# Patient Record
Sex: FEMALE | Race: WHITE
[De-identification: names, ages, dates, MRNs, and addresses within clinical notes are randomized per-mention and may not be internally consistent; named-entity substitution may affect disease eponyms.]

---

## 2017-03-06 ENCOUNTER — HOSPITAL ENCOUNTER (OUTPATIENT)
Dept: HOSPITAL 53 - M LAB | Age: 32
End: 2017-03-06
Attending: NEUROLOGICAL SURGERY
Payer: OTHER GOVERNMENT

## 2017-03-06 DIAGNOSIS — Z01.818: Primary | ICD-10-CM

## 2017-03-06 LAB
ALBUMIN SERPL BCG-MCNC: 3.9 GM/DL (ref 3.2–5.2)
ALBUMIN/GLOB SERPL: 1.34 {RATIO} (ref 1–1.93)
ALP SERPL-CCNC: 69 U/L (ref 45–117)
ALT SERPL W P-5'-P-CCNC: 18 U/L (ref 12–78)
ANION GAP SERPL CALC-SCNC: 6 MEQ/L (ref 8–16)
AST SERPL-CCNC: 18 U/L (ref 15–37)
BASOPHILS # BLD AUTO: 0 K/MM3 (ref 0–0.2)
BASOPHILS NFR BLD AUTO: 0.8 % (ref 0–1)
BILIRUB SERPL-MCNC: 0.5 MG/DL (ref 0.2–1)
BUN SERPL-MCNC: 13 MG/DL (ref 7–18)
CALCIUM SERPL-MCNC: 9 MG/DL (ref 8.5–10.1)
CHLORIDE SERPL-SCNC: 105 MEQ/L (ref 98–107)
CO2 SERPL-SCNC: 31 MEQ/L (ref 21–32)
CREAT SERPL-MCNC: 0.71 MG/DL (ref 0.55–1.02)
EOSINOPHIL # BLD AUTO: 0.2 K/MM3 (ref 0–0.5)
EOSINOPHIL NFR BLD AUTO: 2.2 % (ref 0–3)
ERYTHROCYTE [DISTWIDTH] IN BLOOD BY AUTOMATED COUNT: 12.5 % (ref 11.5–14.5)
GFR SERPL CREATININE-BSD FRML MDRD: > 60 ML/MIN/{1.73_M2} (ref 60–?)
GLUCOSE SERPL-MCNC: 53 MG/DL (ref 70–105)
INR PPP: 1.02
LARGE UNSTAINED CELL #: 0.1 K/MM3 (ref 0–0.4)
LARGE UNSTAINED CELL %: 1.5 % (ref 0–4)
LYMPHOCYTES # BLD AUTO: 1.8 K/MM3 (ref 1.5–6.5)
LYMPHOCYTES NFR BLD AUTO: 26.3 % (ref 24–44)
MCH RBC QN AUTO: 30.5 PG (ref 27–33)
MCHC RBC AUTO-ENTMCNC: 33.3 G/DL (ref 32–36.5)
MCV RBC AUTO: 91.4 FL (ref 80–96)
MONOCYTES # BLD AUTO: 0.4 K/MM3 (ref 0–0.8)
MONOCYTES NFR BLD AUTO: 5.4 % (ref 0–5)
NEUTROPHILS # BLD AUTO: 4.1 K/MM3 (ref 1.8–7.7)
NEUTROPHILS NFR BLD AUTO: 63.8 % (ref 36–66)
PLATELET # BLD AUTO: 170 K/MM3 (ref 150–450)
POTASSIUM SERPL-SCNC: 3.9 MEQ/L (ref 3.5–5.1)
PROT SERPL-MCNC: 6.8 GM/DL (ref 6.4–8.2)
SODIUM SERPL-SCNC: 142 MEQ/L (ref 136–145)
WBC # BLD AUTO: 6.5 K/MM3 (ref 4–10)

## 2017-03-06 NOTE — ECGEPIP
Stationary ECG Study

                              [facility]

                                       

                                       Test Date:    2017

Pat Name:     IRAJ PRYOR         Department:   

Patient ID:   [id number]                 Room:         -

Gender:       F                        Technician:   

:          1985               Requested By: KHUSHI BLUM

Order Number: [id number]     Reading MD:   Isadora Knowles

                                 Measurements

Intervals                              Axis          

Rate:         64                       P:            -8

[place]:           141                      QRS:          73

QRSD:         88                       T:            37

QT:           401                                    

QTc:          415                                    

                           Interpretive Statements

SINUS RHYTHM

NSSTTWA  NO PRIOR

Electronically Signed On 3-6-2017 12:54:47 EST by Isadora Knowles

## 2017-03-06 NOTE — REP
Chest two views

 

HISTORY: Preop

 

Comparison: None

 

The lungs are clear.  The heart is normal in size.  The pulmonary vasculature is

normal in appearance.  The bony structure is intact.

 

IMPRESSION:  No acute disease.

 

 

Signed by

Jc Sifuentes MD 03/06/2017 12:33 P

## 2017-03-15 ENCOUNTER — HOSPITAL ENCOUNTER (INPATIENT)
Dept: HOSPITAL 53 - M OR | Age: 32
LOS: 2 days | Discharge: HOME | DRG: 27 | End: 2017-03-17
Attending: NEUROLOGICAL SURGERY | Admitting: NEUROLOGICAL SURGERY
Payer: OTHER GOVERNMENT

## 2017-03-15 VITALS — DIASTOLIC BLOOD PRESSURE: 68 MMHG | SYSTOLIC BLOOD PRESSURE: 111 MMHG

## 2017-03-15 VITALS — SYSTOLIC BLOOD PRESSURE: 118 MMHG | DIASTOLIC BLOOD PRESSURE: 67 MMHG

## 2017-03-15 VITALS — WEIGHT: 133.82 LBS | HEIGHT: 62 IN | BODY MASS INDEX: 24.63 KG/M2

## 2017-03-15 VITALS — SYSTOLIC BLOOD PRESSURE: 106 MMHG | DIASTOLIC BLOOD PRESSURE: 60 MMHG

## 2017-03-15 VITALS — SYSTOLIC BLOOD PRESSURE: 111 MMHG | DIASTOLIC BLOOD PRESSURE: 71 MMHG

## 2017-03-15 VITALS — DIASTOLIC BLOOD PRESSURE: 65 MMHG | SYSTOLIC BLOOD PRESSURE: 109 MMHG

## 2017-03-15 VITALS — DIASTOLIC BLOOD PRESSURE: 65 MMHG | SYSTOLIC BLOOD PRESSURE: 106 MMHG

## 2017-03-15 VITALS — DIASTOLIC BLOOD PRESSURE: 60 MMHG | SYSTOLIC BLOOD PRESSURE: 105 MMHG

## 2017-03-15 VITALS — SYSTOLIC BLOOD PRESSURE: 110 MMHG | DIASTOLIC BLOOD PRESSURE: 66 MMHG

## 2017-03-15 VITALS — DIASTOLIC BLOOD PRESSURE: 63 MMHG | SYSTOLIC BLOOD PRESSURE: 107 MMHG

## 2017-03-15 VITALS — SYSTOLIC BLOOD PRESSURE: 105 MMHG | DIASTOLIC BLOOD PRESSURE: 65 MMHG

## 2017-03-15 VITALS — DIASTOLIC BLOOD PRESSURE: 65 MMHG | SYSTOLIC BLOOD PRESSURE: 103 MMHG

## 2017-03-15 DIAGNOSIS — M54.81: ICD-10-CM

## 2017-03-15 DIAGNOSIS — Z88.0: ICD-10-CM

## 2017-03-15 DIAGNOSIS — Q07.00: Primary | ICD-10-CM

## 2017-03-15 DIAGNOSIS — Z88.8: ICD-10-CM

## 2017-03-15 LAB — CONTROL LINE UCG: (no result)

## 2017-03-15 PROCEDURE — 00B20ZZ EXCISION OF DURA MATER, OPEN APPROACH: ICD-10-PCS | Performed by: NEUROLOGICAL SURGERY

## 2017-03-15 PROCEDURE — 0PB30ZZ EXCISION OF CERVICAL VERTEBRA, OPEN APPROACH: ICD-10-PCS | Performed by: NEUROLOGICAL SURGERY

## 2017-03-15 RX ADMIN — HYDROCODONE BITARTRATE AND ACETAMINOPHEN PRN TAB: 5; 325 TABLET ORAL at 22:26

## 2017-03-15 RX ADMIN — HYDROMORPHONE HYDROCHLORIDE PRN MG: 1 INJECTION, SOLUTION INTRAMUSCULAR; INTRAVENOUS; SUBCUTANEOUS at 13:55

## 2017-03-15 RX ADMIN — HYDROMORPHONE HYDROCHLORIDE PRN MG: 1 INJECTION, SOLUTION INTRAMUSCULAR; INTRAVENOUS; SUBCUTANEOUS at 13:45

## 2017-03-15 RX ADMIN — ONDANSETRON PRN MG: 2 INJECTION INTRAMUSCULAR; INTRAVENOUS at 15:38

## 2017-03-15 RX ADMIN — HYDROMORPHONE HYDROCHLORIDE PRN MG: 1 INJECTION, SOLUTION INTRAMUSCULAR; INTRAVENOUS; SUBCUTANEOUS at 14:10

## 2017-03-15 RX ADMIN — CLINDAMYCIN PHOSPHATE SCH MLS/HR: 600 INJECTION, SOLUTION INTRAVENOUS at 15:39

## 2017-03-15 RX ADMIN — POTASSIUM CHLORIDE, DEXTROSE MONOHYDRATE AND SODIUM CHLORIDE SCH MLS/HR: 150; 5; 450 INJECTION, SOLUTION INTRAVENOUS at 22:25

## 2017-03-15 RX ADMIN — POTASSIUM CHLORIDE, DEXTROSE MONOHYDRATE AND SODIUM CHLORIDE SCH MLS/HR: 150; 5; 450 INJECTION, SOLUTION INTRAVENOUS at 14:45

## 2017-03-15 RX ADMIN — ONDANSETRON PRN MG: 2 INJECTION INTRAMUSCULAR; INTRAVENOUS at 22:25

## 2017-03-15 RX ADMIN — MORPHINE SULFATE PRN MG: 2 INJECTION, SOLUTION INTRAMUSCULAR; INTRAVENOUS at 19:13

## 2017-03-15 RX ADMIN — HYDROMORPHONE HYDROCHLORIDE PRN MG: 1 INJECTION, SOLUTION INTRAMUSCULAR; INTRAVENOUS; SUBCUTANEOUS at 13:50

## 2017-03-15 RX ADMIN — HYDROMORPHONE HYDROCHLORIDE PRN MG: 1 INJECTION, SOLUTION INTRAMUSCULAR; INTRAVENOUS; SUBCUTANEOUS at 14:05

## 2017-03-15 NOTE — REP
Clinical:  Arnold Chiari syndrome.

 

Technique:  AP, lateral, flexion/extension, bilateral oblique, and open-mouth

views.

 

Findings:

Alignment and lordosis is maintained.  There is no evidence for acute fracture /

compression injury or subluxation.  No significant degenerative changes are

appreciated.  Oblique views demonstrate patent neural foramen.  Open mouth view

demonstrates normal C1-C2 articulation and odontoid process.

 

Impression:

Normal cervical spine series.

 

 

Signed by

Jeramy Rene MD 03/15/2017 08:21 A

## 2017-03-16 VITALS — DIASTOLIC BLOOD PRESSURE: 61 MMHG | SYSTOLIC BLOOD PRESSURE: 110 MMHG

## 2017-03-16 VITALS — SYSTOLIC BLOOD PRESSURE: 112 MMHG | DIASTOLIC BLOOD PRESSURE: 67 MMHG

## 2017-03-16 VITALS — SYSTOLIC BLOOD PRESSURE: 96 MMHG | DIASTOLIC BLOOD PRESSURE: 57 MMHG

## 2017-03-16 VITALS — DIASTOLIC BLOOD PRESSURE: 49 MMHG | SYSTOLIC BLOOD PRESSURE: 86 MMHG

## 2017-03-16 VITALS — SYSTOLIC BLOOD PRESSURE: 85 MMHG | DIASTOLIC BLOOD PRESSURE: 54 MMHG

## 2017-03-16 VITALS — SYSTOLIC BLOOD PRESSURE: 99 MMHG | DIASTOLIC BLOOD PRESSURE: 55 MMHG

## 2017-03-16 VITALS — DIASTOLIC BLOOD PRESSURE: 69 MMHG | SYSTOLIC BLOOD PRESSURE: 108 MMHG

## 2017-03-16 VITALS — DIASTOLIC BLOOD PRESSURE: 57 MMHG | SYSTOLIC BLOOD PRESSURE: 98 MMHG

## 2017-03-16 VITALS — DIASTOLIC BLOOD PRESSURE: 53 MMHG | SYSTOLIC BLOOD PRESSURE: 96 MMHG

## 2017-03-16 VITALS — DIASTOLIC BLOOD PRESSURE: 60 MMHG | SYSTOLIC BLOOD PRESSURE: 101 MMHG

## 2017-03-16 VITALS — DIASTOLIC BLOOD PRESSURE: 59 MMHG | SYSTOLIC BLOOD PRESSURE: 96 MMHG

## 2017-03-16 VITALS — DIASTOLIC BLOOD PRESSURE: 52 MMHG | SYSTOLIC BLOOD PRESSURE: 97 MMHG

## 2017-03-16 VITALS — SYSTOLIC BLOOD PRESSURE: 88 MMHG | DIASTOLIC BLOOD PRESSURE: 53 MMHG

## 2017-03-16 VITALS — SYSTOLIC BLOOD PRESSURE: 96 MMHG | DIASTOLIC BLOOD PRESSURE: 64 MMHG

## 2017-03-16 VITALS — SYSTOLIC BLOOD PRESSURE: 109 MMHG | DIASTOLIC BLOOD PRESSURE: 64 MMHG

## 2017-03-16 VITALS — DIASTOLIC BLOOD PRESSURE: 60 MMHG | SYSTOLIC BLOOD PRESSURE: 106 MMHG

## 2017-03-16 VITALS — SYSTOLIC BLOOD PRESSURE: 96 MMHG | DIASTOLIC BLOOD PRESSURE: 59 MMHG

## 2017-03-16 VITALS — SYSTOLIC BLOOD PRESSURE: 110 MMHG | DIASTOLIC BLOOD PRESSURE: 64 MMHG

## 2017-03-16 VITALS — DIASTOLIC BLOOD PRESSURE: 63 MMHG | SYSTOLIC BLOOD PRESSURE: 108 MMHG

## 2017-03-16 LAB
ALBUMIN SERPL BCG-MCNC: 3.5 GM/DL (ref 3.2–5.2)
ALBUMIN/GLOB SERPL: 1.13 {RATIO} (ref 1–1.93)
ALP SERPL-CCNC: 55 U/L (ref 45–117)
ALT SERPL W P-5'-P-CCNC: 16 U/L (ref 12–78)
ANION GAP SERPL CALC-SCNC: 7 MEQ/L (ref 8–16)
AST SERPL-CCNC: 20 U/L (ref 15–37)
BILIRUB SERPL-MCNC: 0.5 MG/DL (ref 0.2–1)
BUN SERPL-MCNC: 7 MG/DL (ref 7–18)
CALCIUM SERPL-MCNC: 8.1 MG/DL (ref 8.5–10.1)
CHLORIDE SERPL-SCNC: 108 MEQ/L (ref 98–107)
CO2 SERPL-SCNC: 26 MEQ/L (ref 21–32)
CREAT SERPL-MCNC: 0.67 MG/DL (ref 0.55–1.02)
ERYTHROCYTE [DISTWIDTH] IN BLOOD BY AUTOMATED COUNT: 12.4 % (ref 11.5–14.5)
GFR SERPL CREATININE-BSD FRML MDRD: > 60 ML/MIN/{1.73_M2} (ref 60–?)
GLUCOSE SERPL-MCNC: 154 MG/DL (ref 70–105)
MCH RBC QN AUTO: 30 PG (ref 27–33)
MCHC RBC AUTO-ENTMCNC: 33.5 G/DL (ref 32–36.5)
MCV RBC AUTO: 89.4 FL (ref 80–96)
PLATELET # BLD AUTO: 187 K/MM3 (ref 150–450)
POTASSIUM SERPL-SCNC: 4.3 MEQ/L (ref 3.5–5.1)
PROT SERPL-MCNC: 6.6 GM/DL (ref 6.4–8.2)
SODIUM SERPL-SCNC: 141 MEQ/L (ref 136–145)
WBC # BLD AUTO: 16.4 K/MM3 (ref 4–10)

## 2017-03-16 RX ADMIN — CLINDAMYCIN PHOSPHATE SCH MLS/HR: 600 INJECTION, SOLUTION INTRAVENOUS at 08:14

## 2017-03-16 RX ADMIN — SODIUM CHLORIDE, PRESERVATIVE FREE SCH ML: 5 INJECTION INTRAVENOUS at 22:00

## 2017-03-16 RX ADMIN — HYDROCODONE BITARTRATE AND ACETAMINOPHEN PRN TAB: 5; 325 TABLET ORAL at 05:02

## 2017-03-16 RX ADMIN — ONDANSETRON PRN MG: 2 INJECTION INTRAMUSCULAR; INTRAVENOUS at 08:13

## 2017-03-16 RX ADMIN — POTASSIUM CHLORIDE, DEXTROSE MONOHYDRATE AND SODIUM CHLORIDE SCH MLS/HR: 150; 5; 450 INJECTION, SOLUTION INTRAVENOUS at 06:19

## 2017-03-16 RX ADMIN — MORPHINE SULFATE PRN MG: 2 INJECTION, SOLUTION INTRAMUSCULAR; INTRAVENOUS at 01:04

## 2017-03-16 RX ADMIN — HYDROCODONE BITARTRATE AND ACETAMINOPHEN PRN TAB: 5; 325 TABLET ORAL at 13:10

## 2017-03-16 RX ADMIN — CLINDAMYCIN PHOSPHATE SCH MLS/HR: 600 INJECTION, SOLUTION INTRAVENOUS at 00:07

## 2017-03-16 RX ADMIN — POTASSIUM CHLORIDE, DEXTROSE MONOHYDRATE AND SODIUM CHLORIDE SCH MLS/HR: 150; 5; 450 INJECTION, SOLUTION INTRAVENOUS at 13:09

## 2017-03-17 VITALS — SYSTOLIC BLOOD PRESSURE: 108 MMHG | DIASTOLIC BLOOD PRESSURE: 64 MMHG

## 2017-03-17 VITALS — SYSTOLIC BLOOD PRESSURE: 106 MMHG | DIASTOLIC BLOOD PRESSURE: 55 MMHG

## 2017-03-17 VITALS — SYSTOLIC BLOOD PRESSURE: 96 MMHG | DIASTOLIC BLOOD PRESSURE: 53 MMHG

## 2017-03-17 VITALS — DIASTOLIC BLOOD PRESSURE: 51 MMHG | SYSTOLIC BLOOD PRESSURE: 91 MMHG

## 2017-03-17 LAB
ALBUMIN SERPL BCG-MCNC: 3.6 GM/DL (ref 3.2–5.2)
ALBUMIN/GLOB SERPL: 1.06 {RATIO} (ref 1–1.93)
ALP SERPL-CCNC: 56 U/L (ref 45–117)
ALT SERPL W P-5'-P-CCNC: 24 U/L (ref 12–78)
ANION GAP SERPL CALC-SCNC: 10 MEQ/L (ref 8–16)
AST SERPL-CCNC: 25 U/L (ref 15–37)
BILIRUB SERPL-MCNC: 0.4 MG/DL (ref 0.2–1)
BUN SERPL-MCNC: 9 MG/DL (ref 7–18)
CALCIUM SERPL-MCNC: 8.5 MG/DL (ref 8.5–10.1)
CHLORIDE SERPL-SCNC: 105 MEQ/L (ref 98–107)
CO2 SERPL-SCNC: 28 MEQ/L (ref 21–32)
CREAT SERPL-MCNC: 0.62 MG/DL (ref 0.55–1.02)
ERYTHROCYTE [DISTWIDTH] IN BLOOD BY AUTOMATED COUNT: 12.6 % (ref 11.5–14.5)
GFR SERPL CREATININE-BSD FRML MDRD: > 60 ML/MIN/{1.73_M2} (ref 60–?)
GLUCOSE SERPL-MCNC: 96 MG/DL (ref 70–105)
MCH RBC QN AUTO: 29.5 PG (ref 27–33)
MCHC RBC AUTO-ENTMCNC: 32.5 G/DL (ref 32–36.5)
MCV RBC AUTO: 91 FL (ref 80–96)
PLATELET # BLD AUTO: 180 K/MM3 (ref 150–450)
POTASSIUM SERPL-SCNC: 4 MEQ/L (ref 3.5–5.1)
PROT SERPL-MCNC: 7 GM/DL (ref 6.4–8.2)
SODIUM SERPL-SCNC: 143 MEQ/L (ref 136–145)
WBC # BLD AUTO: 11 K/MM3 (ref 4–10)

## 2017-03-17 RX ADMIN — HYDROCODONE BITARTRATE AND ACETAMINOPHEN PRN TAB: 7.5; 325 TABLET ORAL at 07:42

## 2017-03-17 RX ADMIN — HYDROCODONE BITARTRATE AND ACETAMINOPHEN PRN TAB: 7.5; 325 TABLET ORAL at 12:28

## 2017-03-17 RX ADMIN — SODIUM CHLORIDE, PRESERVATIVE FREE SCH ML: 5 INJECTION INTRAVENOUS at 05:51

## 2017-03-17 NOTE — RO
DATE OF PROCEDURE:  03/15/2017

 

PREOPERATIVE DIAGNOSES:  Arnold Chiari malformation, occipital neuralgia.

 

POSTOPERATIVE DIAGNOSES:  Arnold Chiari malformation, occipital neuralgia.

 

PROCEDURE:  Decompression of Arnold Chiari malformation with bilateral

suboccipital craniectomies, excision of posterior wall foramen magnum, partial C1

laminectomy, microvascular  decompression of C2 sensory ganglion for the

occipital neuralgia.

 

SURGEON:  Mj Arciniega MD

 

ASSISTANT:  RACQUEL Scott

 

ANESTHESIA:  General.

 

FINDINGS:  Please my office notes for detailed preoperative evaluation and

discussions.  Patient was seen in the preoperative area, where she was joined by

one of her friends was aware of all options, risks, scope, expected outcome,

sequelae, and all possible complications of the surgery.  Patient understood the

surgery is not curative and that the risk of surgery includes, but is not limited

to, death, coma, paralysis, quadriplegia, persistence or worsening of symptoms

and/or deficits, failure of surgery, need for multiple surgeries, cervical

instability requiring multiple other surgeries, seizure disorder, persistent

vegetative state, dependency on life support measures, infection, bleeding,

Pulmonary embolism (PE), myocardial infarction (MI), deep venous thrombosis

(DVT), and/or any  catastrophic sequelae.  Patient wished to proceed with the

surgery.  She claimed she can no longer live with the symptoms, though she

understands the surgery is not curative and there is no guarantee that any of her

symptoms would be addressed.  She understood the rationale for surgery os is

primarily to decompress and re-establish the cerebrospinal flow across the

craniovertebral junction.  She understood dural graft may have to be used.  After

all matters pertaining to surgery, anesthesia, and followup care had been

discussed and after all the surgical options were discussed, she wished to

proceed with the above surgery.

 

DESCRIPTION OF PROCEDURE:  After informed consent, she was taken to the operating

room, where general endotracheal anesthesia was given by the anesthesia service.

There The area of surgery was prepped and draped in the usual sterile fashion.

Prior to that, neurologic monitoring was used and electrodes were placed by the

monitoring service.  Patient was then turned prone on the operating room table

with the lami rolls and head was placed in the Viramontes headrest.

 

After adequate prep and drape, a skin incision was given just beneath the

occipital protuberance and extending over the C2 spinous process.  The place

Alveolar layer was reached and incised.  Cut edges of the blood vessels were

coagulated with bipolar cautery.  Ligamentum nuchae was opened on either side of

the midline.  Suboccipital muscles were striped off the cranium, and wound edges

were held apart with the help of self-retaining retractors.  C1 was exposed, and

the superior aspect of the lamina and the spinous process of C2 was also exposed

and partially shaved to gain access of the inferior border of the C1.  One bur

hole was created on each suboccipital squama and a craniectomy was performed.

The dura was markedly thin and the cerebellar muscle was severely compressed from

bony compression.  Decompression was carried till until the normal dura was seen,

and no compression was seen with generous epidural space.

 

There were no brain pulsations at this time.  The posterior wall of the foramen

magnum was also removed, and that caused faint pulsations in the cerebellum.   C1

laminectomy was done, and that increased the pulsations of the exposed brain and

the craniovertebral junction.  There was thick and fibrous dural band, which was

constricting with thecal sac, and this was incised and that caused much improved

cerebrospinal fluid (CSF) pulsation and the spinal cord also started to pulsate;

thus, no further decompression or dural grafting was felt to be necessary.

Through the thinned out dura, no obvious arachnoidal scarring was seen.

 

Attention was paid at C1-C2.  The C2 sensory ganglion were exposed beneath the C1

arches on either side.  There were dense veins surrounding it and compressing the

ganglion.  They were coagulated and moved away from these ganglions in the hope

of achieving longer lasting headache control from her occipital neuralgia.

 

Blood loss was minimal or maybe less than 50 mL, and wound was closed in anatomic

layers.  Patient tolerated the procedure satisfactorily and was transferred to

the recovery room in stable condition.  She had no family members or friends in

the waiting room.  However, the friend said that she might drop by in the

hospital later.  Patient's neck was stabilized in the cervical collar.

## 2017-03-19 NOTE — DSES
DATE OF ADMISSION: 03/15/2017

DATE OF DISCHARGE: 03/17/2017

 

DISCHARGE DIAGNOSIS:  Arnold-Chiari malformation.

 

HISTORY: Ms. Harper has a past medical history significant for Arnold-Chiari

malformation, occipital neuralgia, cervical spondylosis, lumbar spondylosis, and

sacroiliitis. Her symptoms for her Arnold-Chiari malformation include right-sided

headache with occasional blurred vision, nausea, unexplained cough and dizziness,

difficulty swallowing food, and photo sensitivity. We discussed all possible

risks for surgery regarding her Arnold-Chiari malformation. She wishes to proceed

with surgery.

 

PROCEDURE PERFORMED: On 03/15/2017, bilateral suboccipital craniectomy, excision

of posterior wall foramen magnum, partial C1 laminectomy, microvascular

decompression of C2 sensory ganglion.

 

DIAGNOSTIC DATA: On 03/15/2017, C-spine with AP flexion and extension views

showed normal cervical spine series. On 03/17/2017, chemistries were all within

normal limits, white blood cells were kind of elevated, otherwise hematology was

within normal limits. UPEG preoperative was negative.

 

HOSPITAL COURSE: Patient was admitted to intensive care unit (ICU) following her

surgery. Her hospital course was uneventful. She had no gross focal motor

deficits noted on exam. She remained afebrile. She denies any postoperative

symptoms. She has been cleared for discharge to home.

 

DISCHARGE MEDICATIONS: Her preadmission medications as well as Cipro 250 mg by

mouth twice a day times five days, and Percocet 5/325 one tablet every eight

hours as needed for pain.

 

DISCHARGE INSTRUCTIONS: The following discharge instructions have been discussed

with the patient.

1. Keep area of incision and bandage completely dry until two week followup in

the office.

 

2. Monitor for signs and symptoms of infection as discussed.

 

3. Patient is to call the office to schedule followup appointment, for any

symptoms or any questions.

 

4. Patient is to continue preadmission medications as well as any new medications

as discussed, Cipro and Percocet.

 

5. Diet as tolerated.

 

6. Activity as tolerated.

 

7. Take temperature twice a day. Call if temperature is greater than or equal to

100 degrees Fahrenheit.

 

8. No driving for two weeks.

 

9. Keep cervical collar on.

 

10. Seizure precautions.

 

Patient states she cannot comply to these recommendations. She understands and is

aware of possible catastrophic sequelae if she does not follow them. She does

agree to followup within two weeks or sooner if needed.

## 2017-10-02 ENCOUNTER — HOSPITAL ENCOUNTER (EMERGENCY)
Dept: HOSPITAL 53 - M ED | Age: 32
Discharge: HOME | End: 2017-10-02
Payer: COMMERCIAL

## 2017-10-02 VITALS — HEIGHT: 62 IN | BODY MASS INDEX: 25.63 KG/M2 | WEIGHT: 139.29 LBS

## 2017-10-02 VITALS — SYSTOLIC BLOOD PRESSURE: 106 MMHG | DIASTOLIC BLOOD PRESSURE: 68 MMHG

## 2017-10-02 DIAGNOSIS — G90.09: Primary | ICD-10-CM

## 2017-10-02 DIAGNOSIS — Z88.2: ICD-10-CM

## 2017-10-02 DIAGNOSIS — Q07.00: ICD-10-CM

## 2017-10-02 DIAGNOSIS — Z88.0: ICD-10-CM

## 2017-10-02 LAB
ALBUMIN SERPL BCG-MCNC: 3.8 GM/DL (ref 3.2–5.2)
ALBUMIN/GLOB SERPL: 1.19 {RATIO} (ref 1–1.93)
ALP SERPL-CCNC: 78 U/L (ref 45–117)
ALT SERPL W P-5'-P-CCNC: 20 U/L (ref 12–78)
ANION GAP SERPL CALC-SCNC: 6 MEQ/L (ref 8–16)
AST SERPL-CCNC: 14 U/L (ref 15–37)
BASOPHILS # BLD AUTO: 0.1 10^3/UL (ref 0–0.2)
BASOPHILS NFR BLD AUTO: 1.2 % (ref 0–1)
BILIRUB CONJ SERPL-MCNC: 0.1 MG/DL (ref 0–0.2)
BILIRUB SERPL-MCNC: 0.3 MG/DL (ref 0.2–1)
BUN SERPL-MCNC: 13 MG/DL (ref 7–18)
CALCIUM SERPL-MCNC: 8.6 MG/DL (ref 8.5–10.1)
CHLORIDE SERPL-SCNC: 107 MEQ/L (ref 98–107)
CO2 SERPL-SCNC: 28 MEQ/L (ref 21–32)
CREAT SERPL-MCNC: 0.84 MG/DL (ref 0.55–1.02)
EOSINOPHIL # BLD AUTO: 0.2 10^3/UL (ref 0–0.5)
EOSINOPHIL NFR BLD AUTO: 2.6 % (ref 0–3)
ERYTHROCYTE [DISTWIDTH] IN BLOOD BY AUTOMATED COUNT: 13.1 % (ref 11.5–14.5)
GFR SERPL CREATININE-BSD FRML MDRD: > 60 ML/MIN/{1.73_M2} (ref 60–?)
GLUCOSE SERPL-MCNC: 112 MG/DL (ref 70–105)
IMM GRANULOCYTES NFR BLD: 0.4 % (ref 0–0)
LYMPHOCYTES # BLD AUTO: 3 10^3/UL (ref 1.5–4.5)
LYMPHOCYTES NFR BLD AUTO: 38.4 % (ref 24–44)
MCH RBC QN AUTO: 30.2 PG (ref 27–33)
MCHC RBC AUTO-ENTMCNC: 34.1 G/DL (ref 32–36.5)
MCV RBC AUTO: 88.5 FL (ref 80–96)
MONOCYTES # BLD AUTO: 0.5 10^3/UL (ref 0–0.8)
MONOCYTES NFR BLD AUTO: 6 % (ref 0–5)
NEUTROPHILS # BLD AUTO: 4 10^3/UL (ref 1.8–7.7)
NEUTROPHILS NFR BLD AUTO: 51.4 % (ref 36–66)
NRBC BLD AUTO-RTO: 0 % (ref 0–0)
PLATELET # BLD AUTO: 225 10^3/UL (ref 150–450)
POTASSIUM SERPL-SCNC: 4 MEQ/L (ref 3.5–5.1)
PROT SERPL-MCNC: 7 GM/DL (ref 6.4–8.2)
SODIUM SERPL-SCNC: 141 MEQ/L (ref 136–145)
T3RU NFR SERPL: 30 % (ref 30–39)
T4 SERPL-MCNC: 7.2 UG/DL (ref 4.5–12)
VIT B12 SERPL-MCNC: 401 PG/ML (ref 247–911)
WBC # BLD AUTO: 7.8 10^3/UL (ref 4–10)

## 2017-10-02 NOTE — REPUSA
MRI of the brain.

Clinical history: neuropathy.

Technique: Multiecho multiplanar MRI images of the brain were obtained without administration of cont
rast. Diffusion weighted images with ADC mapping was also obtained.

Findings:

The ventricles and sulci are symmetric bilaterally. The brain parenchyma demonstrates uniform and nor
mal signal on all sequences. There is no midline shift, mass effect, or extra-axial fluid collection.
 The midline intracranial structures do not demonstrate any gross abnormalities. The cervical cranial
 junction is intact. The orbits are unremarkable. The visualized paranasal sinuses and mastoid air ce
lls are clear. The osseous structures and superficial soft tissues are unremarkable. The vascular str
uctures demonstrate appropriate flow voids.

Impression: Normal MRI of the Brain.

     Electronically signed by PHILIPP CROOKS MD on 10/02/2017 09:41:52 PM ET

## 2017-10-04 ENCOUNTER — HOSPITAL ENCOUNTER (EMERGENCY)
Dept: HOSPITAL 53 - M ED | Age: 32
Discharge: HOME | End: 2017-10-04
Payer: COMMERCIAL

## 2017-10-04 VITALS — DIASTOLIC BLOOD PRESSURE: 73 MMHG | SYSTOLIC BLOOD PRESSURE: 108 MMHG

## 2017-10-04 VITALS — WEIGHT: 135.14 LBS | HEIGHT: 62 IN | BODY MASS INDEX: 24.87 KG/M2

## 2017-10-04 DIAGNOSIS — M54.2: ICD-10-CM

## 2017-10-04 DIAGNOSIS — T76.21XA: Primary | ICD-10-CM

## 2017-10-04 DIAGNOSIS — G89.29: ICD-10-CM

## 2017-10-04 LAB
ADD MANUAL DIFFER: NO
ALBUMIN SERPL BCG-MCNC: 4 GM/DL (ref 3.2–5.2)
ALBUMIN/GLOB SERPL: 1.33 {RATIO} (ref 1–1.93)
ALP SERPL-CCNC: 59 U/L (ref 45–117)
ALT SERPL W P-5'-P-CCNC: 19 U/L (ref 12–78)
ANION GAP SERPL CALC-SCNC: 6 MEQ/L (ref 8–16)
AST SERPL-CCNC: 12 U/L (ref 15–37)
BASOPHILS # BLD AUTO: 0.1 10^3/UL (ref 0–0.2)
BASOPHILS NFR BLD AUTO: 1 % (ref 0–1)
BILIRUB SERPL-MCNC: 0.6 MG/DL (ref 0.2–1)
BUN SERPL-MCNC: 12 MG/DL (ref 7–18)
CALCIUM SERPL-MCNC: 9.3 MG/DL (ref 8.5–10.1)
CHLORIDE SERPL-SCNC: 105 MEQ/L (ref 98–107)
CO2 SERPL-SCNC: 28 MEQ/L (ref 21–32)
CONTROL LINE HCG: (no result)
CREAT SERPL-MCNC: 0.63 MG/DL (ref 0.55–1.02)
DIFF SLIDE NUMBER: 240
EOSINOPHIL # BLD AUTO: 0.1 10^3/UL (ref 0–0.5)
EOSINOPHIL NFR BLD AUTO: 1.7 % (ref 0–3)
ERYTHROCYTE [DISTWIDTH] IN BLOOD BY AUTOMATED COUNT: 12.9 % (ref 11.5–14.5)
GFR SERPL CREATININE-BSD FRML MDRD: > 60 ML/MIN/{1.73_M2} (ref 60–?)
GLUCOSE SERPL-MCNC: 89 MG/DL (ref 70–105)
HBV SURFACE AB SER QL: NEGATIVE
IMM GRANULOCYTES NFR BLD: 0.3 % (ref 0–0)
LYMPHOCYTES # BLD AUTO: 2.2 10^3/UL (ref 1.5–4.5)
LYMPHOCYTES NFR BLD AUTO: 36.8 % (ref 24–44)
MCH RBC QN AUTO: 29.7 PG (ref 27–33)
MCHC RBC AUTO-ENTMCNC: 34 G/DL (ref 32–36.5)
MCV RBC AUTO: 87.5 FL (ref 80–96)
MONOCYTES # BLD AUTO: 0.3 10^3/UL (ref 0–0.8)
MONOCYTES NFR BLD AUTO: 5.6 % (ref 0–5)
NEUTROPHILS # BLD AUTO: 3.2 10^3/UL (ref 1.8–7.7)
NEUTROPHILS NFR BLD AUTO: 54.6 % (ref 36–66)
NRBC BLD AUTO-RTO: 0 % (ref 0–0)
PLATELET # BLD AUTO: 206 10^3/UL (ref 150–450)
POTASSIUM SERPL-SCNC: 4.4 MEQ/L (ref 3.5–5.1)
PROT SERPL-MCNC: 7 GM/DL (ref 6.4–8.2)
SODIUM SERPL-SCNC: 139 MEQ/L (ref 136–145)
WBC # BLD AUTO: 5.9 10^3/UL (ref 4–10)

## 2017-10-04 PROCEDURE — 85025 COMPLETE CBC W/AUTO DIFF WBC: CPT

## 2017-10-04 PROCEDURE — 96372 THER/PROPH/DIAG INJ SC/IM: CPT

## 2017-10-04 PROCEDURE — 86706 HEP B SURFACE ANTIBODY: CPT

## 2017-10-04 PROCEDURE — 87340 HEPATITIS B SURFACE AG IA: CPT

## 2017-10-04 PROCEDURE — 87389 HIV-1 AG W/HIV-1&-2 AB AG IA: CPT

## 2017-10-04 PROCEDURE — 84703 CHORIONIC GONADOTROPIN ASSAY: CPT

## 2017-10-04 PROCEDURE — 86780 TREPONEMA PALLIDUM: CPT

## 2017-10-04 PROCEDURE — 99282 EMERGENCY DEPT VISIT SF MDM: CPT

## 2017-10-04 PROCEDURE — 86803 HEPATITIS C AB TEST: CPT

## 2017-10-04 PROCEDURE — 80053 COMPREHEN METABOLIC PANEL: CPT

## 2017-10-18 ENCOUNTER — HOSPITAL ENCOUNTER (OUTPATIENT)
Dept: HOSPITAL 53 - M SFHCPLAZ | Age: 32
End: 2017-10-18
Attending: FAMILY MEDICINE
Payer: COMMERCIAL

## 2017-10-18 DIAGNOSIS — E28.319: ICD-10-CM

## 2017-10-18 DIAGNOSIS — R23.2: Primary | ICD-10-CM

## 2017-10-18 DIAGNOSIS — Z11.3: ICD-10-CM

## 2017-10-18 LAB
FSH SERPL-ACNC: 4 MIU/ML
LH SERPL-ACNC: 7 MIU/ML
T4 FREE SERPL-MCNC: 0.83 NG/DL (ref 0.76–1.46)

## 2018-02-13 ENCOUNTER — HOSPITAL ENCOUNTER (OUTPATIENT)
Dept: HOSPITAL 53 - M SFHCPLAZ | Age: 33
End: 2018-02-13
Attending: FAMILY MEDICINE
Payer: COMMERCIAL

## 2018-02-13 DIAGNOSIS — Z92.29: Primary | ICD-10-CM

## 2018-02-13 PROCEDURE — 86765 RUBEOLA ANTIBODY: CPT

## 2018-02-14 LAB — RUBELLA IGG QUALITATIVE: (no result)

## 2018-02-15 LAB — RUBEOLA IGG ANTIBODY: <25 AU/ML

## 2018-06-06 ENCOUNTER — HOSPITAL ENCOUNTER (OUTPATIENT)
Dept: HOSPITAL 53 - M SFHCPLAZ | Age: 33
End: 2018-06-06
Attending: FAMILY MEDICINE
Payer: SELF-PAY

## 2018-06-06 DIAGNOSIS — Z11.3: Primary | ICD-10-CM

## 2018-06-06 LAB
CHLAMYDIA DNA AMPLIFICATION: NEGATIVE
GC DNA AMPLIFICATION: NEGATIVE
HIV 1&2 SCREEN CENTAUR: NEGATIVE
SYPHILIS: NONREACTIVE

## 2018-06-06 PROCEDURE — 36415 COLL VENOUS BLD VENIPUNCTURE: CPT

## 2019-01-25 ENCOUNTER — HOSPITAL ENCOUNTER (EMERGENCY)
Dept: HOSPITAL 53 - M ED | Age: 34
Discharge: HOME | End: 2019-01-25
Payer: COMMERCIAL

## 2019-01-25 VITALS — BODY MASS INDEX: 25.82 KG/M2 | HEIGHT: 62 IN | WEIGHT: 140.3 LBS

## 2019-01-25 VITALS — SYSTOLIC BLOOD PRESSURE: 100 MMHG | DIASTOLIC BLOOD PRESSURE: 74 MMHG

## 2019-01-25 DIAGNOSIS — Z88.0: ICD-10-CM

## 2019-01-25 DIAGNOSIS — R07.1: Primary | ICD-10-CM

## 2019-01-25 DIAGNOSIS — Z87.728: ICD-10-CM

## 2019-01-25 DIAGNOSIS — I49.9: ICD-10-CM

## 2019-01-25 DIAGNOSIS — Z88.2: ICD-10-CM

## 2019-01-25 DIAGNOSIS — Z88.1: ICD-10-CM

## 2019-01-25 LAB
ALBUMIN SERPL BCG-MCNC: 4 GM/DL (ref 3.2–5.2)
ALT SERPL W P-5'-P-CCNC: 33 U/L (ref 12–78)
B-HCG SERPL-ACNC: < 1 MIU/ML
BASOPHILS # BLD AUTO: 0.1 10^3/UL (ref 0–0.2)
BASOPHILS NFR BLD AUTO: 0.8 % (ref 0–1)
BILIRUB SERPL-MCNC: 0.2 MG/DL (ref 0.2–1)
BUN SERPL-MCNC: 19 MG/DL (ref 7–18)
CALCIUM SERPL-MCNC: 8.3 MG/DL (ref 8.5–10.1)
CHLORIDE SERPL-SCNC: 106 MEQ/L (ref 98–107)
CK MB CFR.DF SERPL CALC: 0.95
CK MB SERPL-MCNC: 1 NG/ML (ref ?–3.6)
CK SERPL-CCNC: 105 U/L (ref 26–192)
CO2 SERPL-SCNC: 26 MEQ/L (ref 21–32)
CREAT SERPL-MCNC: 0.71 MG/DL (ref 0.55–1.3)
EOSINOPHIL # BLD AUTO: 0.2 10^3/UL (ref 0–0.5)
EOSINOPHIL NFR BLD AUTO: 2.4 % (ref 0–3)
GFR SERPL CREATININE-BSD FRML MDRD: > 60 ML/MIN/{1.73_M2} (ref 60–?)
GLUCOSE SERPL-MCNC: 68 MG/DL (ref 70–100)
HCT VFR BLD AUTO: 42 % (ref 36–47)
HGB BLD-MCNC: 14.2 G/DL (ref 12–15.5)
LYMPHOCYTES # BLD AUTO: 3.5 10^3/UL (ref 1.5–4.5)
LYMPHOCYTES NFR BLD AUTO: 35.6 % (ref 24–44)
MCH RBC QN AUTO: 30 PG (ref 27–33)
MCHC RBC AUTO-ENTMCNC: 33.8 G/DL (ref 32–36.5)
MCV RBC AUTO: 88.6 FL (ref 80–96)
MONOCYTES # BLD AUTO: 0.6 10^3/UL (ref 0–0.8)
MONOCYTES NFR BLD AUTO: 5.9 % (ref 0–5)
NEUTROPHILS # BLD AUTO: 5.3 10^3/UL (ref 1.8–7.7)
NEUTROPHILS NFR BLD AUTO: 54.7 % (ref 36–66)
PLATELET # BLD AUTO: 216 10^3/UL (ref 150–450)
POTASSIUM SERPL-SCNC: 4.1 MEQ/L (ref 3.5–5.1)
PROT SERPL-MCNC: 6.8 GM/DL (ref 6.4–8.2)
RBC # BLD AUTO: 4.74 10^6/UL (ref 4–5.4)
SODIUM SERPL-SCNC: 140 MEQ/L (ref 136–145)
TROPONIN I SERPL-MCNC: < 0.02 NG/ML (ref ?–0.1)
TSH SERPL DL<=0.005 MIU/L-ACNC: 1.02 UIU/ML (ref 0.36–3.74)
WBC # BLD AUTO: 9.7 10^3/UL (ref 4–10)

## 2019-01-25 PROCEDURE — 84443 ASSAY THYROID STIM HORMONE: CPT

## 2019-01-25 PROCEDURE — 93005 ELECTROCARDIOGRAM TRACING: CPT

## 2019-01-25 PROCEDURE — 99284 EMERGENCY DEPT VISIT MOD MDM: CPT

## 2019-01-25 PROCEDURE — 84702 CHORIONIC GONADOTROPIN TEST: CPT

## 2019-01-25 PROCEDURE — 85025 COMPLETE CBC W/AUTO DIFF WBC: CPT

## 2019-01-25 PROCEDURE — 82553 CREATINE MB FRACTION: CPT

## 2019-01-25 PROCEDURE — 85379 FIBRIN DEGRADATION QUANT: CPT

## 2019-01-25 PROCEDURE — 82550 ASSAY OF CK (CPK): CPT

## 2019-01-25 PROCEDURE — 96374 THER/PROPH/DIAG INJ IV PUSH: CPT

## 2019-01-25 PROCEDURE — 71046 X-RAY EXAM CHEST 2 VIEWS: CPT

## 2019-01-25 PROCEDURE — 80053 COMPREHEN METABOLIC PANEL: CPT

## 2019-01-26 NOTE — REP
Clinical:  Right-sided chest pain .

 

Comparison: 03/06/2017 .

 

Technique:  PA and lateral.

 

Findings:

The mediastinum and cardiac silhouette are normal.  The lung fields are clear and

without acute consolidation, effusion, or pneumothorax.  The skeletal structures

are intact and normal.

 

Impression:

1.   No acute cardiopulmonary process.

 

 

Electronically Signed by

Jeramy Rene MD 01/26/2019 08:09 A

## 2019-01-27 NOTE — ECGEPIP
Stationary ECG Study

                           [facility]

                                       

                                       Test Date:    2019

Pat Name:     IRAJ PRYOR         Department:   

Patient ID:   [id number]                 Room:         -

Gender:       F                        Technician:   ct

:          1985               Requested By: MURTAZA IZQUIERDO PA-C

Order Number: [id number]     Reading MD:   Marielos Fontaine

                                 Measurements

Intervals                              Axis          

Rate:         72                       P:            50

ND:           156                      QRS:          67

QRSD:         96                       T:            43

QT:           385                                    

QTc:          423                                    

                           Interpretive Statements

SINUS RHYTHM WITH SINUS ARRHYTHMIA

INCREASED RATE 3/6/17

Electronically Signed On 2019 9:10:04 EST by Marielos Fontaine

## 2019-04-23 ENCOUNTER — HOSPITAL ENCOUNTER (OUTPATIENT)
Dept: HOSPITAL 53 - M LAB REF | Age: 34
End: 2019-04-23
Attending: FAMILY MEDICINE
Payer: COMMERCIAL

## 2019-04-23 DIAGNOSIS — N91.0: Primary | ICD-10-CM

## 2019-04-23 LAB
ALBUMIN SERPL BCG-MCNC: 3.7 GM/DL (ref 3.2–5.2)
ALT SERPL W P-5'-P-CCNC: 17 U/L (ref 12–78)
B-HCG SERPL-ACNC: < 1 MIU/ML
BILIRUB SERPL-MCNC: 0.5 MG/DL (ref 0.2–1)
BUN SERPL-MCNC: 12 MG/DL (ref 7–18)
CALCIUM SERPL-MCNC: 8.5 MG/DL (ref 8.5–10.1)
CHLORIDE SERPL-SCNC: 107 MEQ/L (ref 98–107)
CO2 SERPL-SCNC: 28 MEQ/L (ref 21–32)
CREAT SERPL-MCNC: 0.58 MG/DL (ref 0.55–1.3)
GFR SERPL CREATININE-BSD FRML MDRD: > 60 ML/MIN/{1.73_M2} (ref 60–?)
GLUCOSE SERPL-MCNC: 85 MG/DL (ref 70–100)
POTASSIUM SERPL-SCNC: 4.4 MEQ/L (ref 3.5–5.1)
PROT SERPL-MCNC: 6.7 GM/DL (ref 6.4–8.2)
SODIUM SERPL-SCNC: 140 MEQ/L (ref 136–145)
T4 FREE SERPL-MCNC: 0.81 NG/DL (ref 0.76–1.46)
TSH SERPL DL<=0.005 MIU/L-ACNC: 1.42 UIU/ML (ref 0.36–3.74)

## 2019-07-24 ENCOUNTER — HOSPITAL ENCOUNTER (OUTPATIENT)
Dept: HOSPITAL 53 - M LAB REF | Age: 34
End: 2019-07-24
Attending: OBSTETRICS & GYNECOLOGY
Payer: COMMERCIAL

## 2019-07-24 DIAGNOSIS — Z12.4: Primary | ICD-10-CM

## 2019-07-26 LAB — HPV LOW VOL RFLX: (no result)

## 2019-09-26 ENCOUNTER — HOSPITAL ENCOUNTER (EMERGENCY)
Dept: HOSPITAL 53 - M ED | Age: 34
LOS: 1 days | Discharge: HOME | End: 2019-09-27
Payer: COMMERCIAL

## 2019-09-26 VITALS — HEIGHT: 62 IN | WEIGHT: 145.31 LBS | BODY MASS INDEX: 26.74 KG/M2

## 2019-09-26 DIAGNOSIS — Z88.0: ICD-10-CM

## 2019-09-26 DIAGNOSIS — Z88.2: ICD-10-CM

## 2019-09-26 DIAGNOSIS — Z3A.09: ICD-10-CM

## 2019-09-26 DIAGNOSIS — O34.81: Primary | ICD-10-CM

## 2019-09-26 DIAGNOSIS — Z79.899: ICD-10-CM

## 2019-09-26 DIAGNOSIS — Q07.00: ICD-10-CM

## 2019-09-26 DIAGNOSIS — O99.89: ICD-10-CM

## 2019-09-26 PROCEDURE — 80048 BASIC METABOLIC PNL TOTAL CA: CPT

## 2019-09-26 PROCEDURE — 81001 URINALYSIS AUTO W/SCOPE: CPT

## 2019-09-26 PROCEDURE — 83690 ASSAY OF LIPASE: CPT

## 2019-09-26 PROCEDURE — 99284 EMERGENCY DEPT VISIT MOD MDM: CPT

## 2019-09-26 PROCEDURE — 80076 HEPATIC FUNCTION PANEL: CPT

## 2019-09-26 PROCEDURE — 36415 COLL VENOUS BLD VENIPUNCTURE: CPT

## 2019-09-26 PROCEDURE — 85025 COMPLETE CBC W/AUTO DIFF WBC: CPT

## 2019-09-26 PROCEDURE — 93976 VASCULAR STUDY: CPT

## 2019-09-26 PROCEDURE — 96374 THER/PROPH/DIAG INJ IV PUSH: CPT

## 2019-09-26 PROCEDURE — 76801 OB US < 14 WKS SINGLE FETUS: CPT

## 2019-09-27 VITALS — SYSTOLIC BLOOD PRESSURE: 103 MMHG | DIASTOLIC BLOOD PRESSURE: 62 MMHG

## 2019-09-27 LAB
ALBUMIN SERPL BCG-MCNC: 3.6 GM/DL (ref 3.2–5.2)
ALT SERPL W P-5'-P-CCNC: 16 U/L (ref 12–78)
BASOPHILS # BLD AUTO: 0.1 10^3/UL (ref 0–0.2)
BASOPHILS NFR BLD AUTO: 0.5 % (ref 0–1)
BILIRUB CONJ SERPL-MCNC: < 0.1 MG/DL (ref 0–0.2)
BILIRUB SERPL-MCNC: 0.2 MG/DL (ref 0.2–1)
BUN SERPL-MCNC: 10 MG/DL (ref 7–18)
CALCIUM SERPL-MCNC: 9.3 MG/DL (ref 8.5–10.1)
CHLORIDE SERPL-SCNC: 105 MEQ/L (ref 98–107)
CO2 SERPL-SCNC: 25 MEQ/L (ref 21–32)
CREAT SERPL-MCNC: 0.52 MG/DL (ref 0.55–1.3)
EOSINOPHIL # BLD AUTO: 0.1 10^3/UL (ref 0–0.5)
EOSINOPHIL NFR BLD AUTO: 0.5 % (ref 0–3)
GFR SERPL CREATININE-BSD FRML MDRD: > 60 ML/MIN/{1.73_M2} (ref 60–?)
GLUCOSE SERPL-MCNC: 83 MG/DL (ref 70–100)
HCT VFR BLD AUTO: 36.4 % (ref 36–47)
HGB BLD-MCNC: 12.5 G/DL (ref 12–15.5)
LIPASE SERPL-CCNC: 109 U/L (ref 73–393)
LYMPHOCYTES # BLD AUTO: 2.8 10^3/UL (ref 1.5–5)
LYMPHOCYTES NFR BLD AUTO: 25 % (ref 24–44)
MCH RBC QN AUTO: 30.6 PG (ref 27–33)
MCHC RBC AUTO-ENTMCNC: 34.3 G/DL (ref 32–36.5)
MCV RBC AUTO: 89.2 FL (ref 80–96)
MONOCYTES # BLD AUTO: 0.7 10^3/UL (ref 0–0.8)
MONOCYTES NFR BLD AUTO: 6.3 % (ref 0–5)
NEUTROPHILS # BLD AUTO: 7.4 10^3/UL (ref 1.5–8.5)
NEUTROPHILS NFR BLD AUTO: 66.4 % (ref 36–66)
PLATELET # BLD AUTO: 191 10^3/UL (ref 150–450)
POTASSIUM SERPL-SCNC: 3.7 MEQ/L (ref 3.5–5.1)
PROT SERPL-MCNC: 6.6 GM/DL (ref 6.4–8.2)
RBC # BLD AUTO: 4.08 10^6/UL (ref 4–5.4)
SODIUM SERPL-SCNC: 137 MEQ/L (ref 136–145)
WBC # BLD AUTO: 11.2 10^3/UL (ref 4–10)

## 2019-09-27 NOTE — REPVR
PROCEDURE INFORMATION: 

Exam: US Pregnancy First Trimester, Transabdominal 

Exam date and time: 9/27/2019 12:55 AM 

Clinical history: 33 years old, female; Pregnancy complicated by abdominal or 

pelvic pain; Right lower quadrant; First trimester; Gestational age or lmp: 9w 

4d; Pregnant; Additional info: R pelvic pain, severe, unknown gestation age 



TECHNIQUE: 

Imaging protocol: Real-time transabdominal obstetrical ultrasound of the 

maternal pelvis and a first trimester pregnancy, less than 14 weeks 0 days, 

with image documentation. 



COMPARISON: 

CR Spine. Lumbosacral, complete 11/30/2016 3:59 PM 



FINDINGS: 



GESTATION: 

Gestation: Single live intrauterine pregnancy corresponding to 9 weeks 4 days 

with FLYNN of 4/27/20. Fetal pole is identified. Crown-rump length measures 2.7 

cm. 

Heart rate: Fetal heart rate is 169 beats per minute. 

No subchorionic hemorrhage is seen. 



MATERNAL: 

Uterus: Unremarkable. 

Cervix: Unremarkable. 

Right adnexa: Right ovarian cyst likely corpus luteal measuring 2.4 x 2.1 x 2.3 

cm. Right ovary measures 5.0 x 5.2 x 3.0 cm. Normal vascular flow is seen in 

the right ovary. 

Left adnexa: Left ovary is unremarkable measuring 2.8 x 4.3 x 2.0 cm. 

Intraperitoneal: No intraperitoneal free fluid. 



IMPRESSION: 

Single live intrauterine pregnancy corresponding to 9 weeks 4 days with FLYNN of 

4/27/20. Followup is recommended.

No subchorionic hemorrhage is seen.

Right ovarian cyst likely corpus luteal measuring 2.4 x 2.1 x 2.3 cm.  



Electronically signed by: Yuliya Harp On 09/27/2019  02:20:07 AM

## 2019-10-01 ENCOUNTER — HOSPITAL ENCOUNTER (OUTPATIENT)
Dept: HOSPITAL 53 - M SMT | Age: 34
End: 2019-10-01
Attending: ADVANCED PRACTICE MIDWIFE
Payer: COMMERCIAL

## 2019-10-01 DIAGNOSIS — Z3A.10: ICD-10-CM

## 2019-10-01 DIAGNOSIS — Z34.81: Primary | ICD-10-CM

## 2019-10-01 LAB
ALT SERPL W P-5'-P-CCNC: 16 U/L (ref 12–78)
BASOPHILS # BLD AUTO: 0.1 10^3/UL (ref 0–0.2)
BASOPHILS NFR BLD AUTO: 0.7 % (ref 0–1)
BILIRUB SERPL-MCNC: 0.4 MG/DL (ref 0.2–1)
CREAT SERPL-MCNC: 0.57 MG/DL (ref 0.55–1.3)
EOSINOPHIL # BLD AUTO: 0.1 10^3/UL (ref 0–0.5)
EOSINOPHIL NFR BLD AUTO: 0.6 % (ref 0–3)
EST. AVERAGE GLUCOSE BLD GHB EST-MCNC: 94 MG/DL (ref 60–110)
GFR SERPL CREATININE-BSD FRML MDRD: > 60 ML/MIN/{1.73_M2} (ref 60–?)
HCT VFR BLD AUTO: 42.4 % (ref 36–47)
HGB BLD-MCNC: 14.3 G/DL (ref 12–15.5)
LDH SERPL L TO P-CCNC: 157 U/L (ref 84–246)
LYMPHOCYTES # BLD AUTO: 2.5 10^3/UL (ref 1.5–5)
LYMPHOCYTES NFR BLD AUTO: 23.3 % (ref 24–44)
MCH RBC QN AUTO: 30.8 PG (ref 27–33)
MCHC RBC AUTO-ENTMCNC: 33.7 G/DL (ref 32–36.5)
MCV RBC AUTO: 91.4 FL (ref 80–96)
MONOCYTES # BLD AUTO: 0.7 10^3/UL (ref 0–0.8)
MONOCYTES NFR BLD AUTO: 6.3 % (ref 0–5)
NEUTROPHILS # BLD AUTO: 7.3 10^3/UL (ref 1.5–8.5)
NEUTROPHILS NFR BLD AUTO: 68.3 % (ref 36–66)
PLATELET # BLD AUTO: 195 10^3/UL (ref 150–450)
RBC # BLD AUTO: 4.64 10^6/UL (ref 4–5.4)
URATE SERPL-MCNC: 2.5 MG/DL (ref 2.6–6)
WBC # BLD AUTO: 10.7 10^3/UL (ref 4–10)

## 2019-10-02 LAB
HCV AB SER QL: 0.1 INDEX (ref ?–0.8)
HIV 1+2 AB+HIV1 P24 AG SERPL QL IA: NEGATIVE
RUBELLA IGG QUALITATIVE: (no result)

## 2019-10-28 ENCOUNTER — HOSPITAL ENCOUNTER (OUTPATIENT)
Dept: HOSPITAL 53 - M LAB REF | Age: 34
End: 2019-10-28
Attending: ADVANCED PRACTICE MIDWIFE
Payer: COMMERCIAL

## 2019-10-28 DIAGNOSIS — Z3A.00: ICD-10-CM

## 2019-10-28 DIAGNOSIS — O34.219: Primary | ICD-10-CM

## 2019-10-28 LAB
CHLAMYDIA DNA AMPLIFICATION: NEGATIVE
CREAT UR-MCNC: 30.6 MG/DL
N GONORRHOEA RRNA SPEC QL NAA+PROBE: NEGATIVE
PROT UR-MCNC: < 5 MG/DL (ref 0–12)

## 2019-11-18 ENCOUNTER — HOSPITAL ENCOUNTER (OUTPATIENT)
Dept: HOSPITAL 53 - M RAD | Age: 34
End: 2019-11-18
Attending: ADVANCED PRACTICE MIDWIFE
Payer: COMMERCIAL

## 2019-11-18 DIAGNOSIS — Z34.81: Primary | ICD-10-CM

## 2019-11-18 NOTE — REP
OB ULTRASOUND:

 

Real-time sonographic evaluation of the gravid uterus is performed.

 

There is a single living intrauterine gestation.  The estimated gestational age

is 17 weeks 0 days. EDC 04/27/2020. Today's measurements indicate appropriate

growth.

 

BPD 37 mm 17 weeks 3 days, 66th percentile.

 

 mm 17 weeks 1 day, 58th percentile.

 

 mm 17 weeks 1 day, 52nd percentile.

 

Femur length 23 mm 16 weeks 6 days, 44th percentile.

 

HC/AC ratio 1.23 within normal range.

 

Estimated fetal weight 177 grams, 46th percentile. Cervix is closed and measures

2.8 cm in length. Fetal heart rate 157 beats per minute.

 

Today's measurements indicate appropriate growth.

 

                                       SEEN/GROSSLY UNREMARKABLE

 

Lateral ventricles                       Yes

 

Posterior fossa                          Yes

 

Upper lip                                    Yes

 

Four-chamber heart                   Yes

 

LVOT                                         Yes

 

RVOT                                        Yes

 

Stomach                                    Yes

 

Cord insertion                            Yes

 

Three vessel cord                      Yes

 

Kidneys                                      Yes

 

Bladder                                      Yes

 

Spine                                         No

 

Fetal position:  Breech.

 

Placenta:  Anterior and grade 0 with no previa or abruption.

 

Amniotic fluid:  Within normal limits.

 

 

Electronically Signed by

Abdirizak Curry MD 11/20/2019 10:23 A